# Patient Record
Sex: FEMALE | Race: WHITE | NOT HISPANIC OR LATINO | Employment: PART TIME | URBAN - METROPOLITAN AREA
[De-identification: names, ages, dates, MRNs, and addresses within clinical notes are randomized per-mention and may not be internally consistent; named-entity substitution may affect disease eponyms.]

---

## 2019-01-08 ENCOUNTER — TRANSCRIBE ORDERS (OUTPATIENT)
Dept: ADMINISTRATIVE | Facility: HOSPITAL | Age: 19
End: 2019-01-08

## 2019-01-08 DIAGNOSIS — G89.29 CHRONIC RIGHT SHOULDER PAIN: Primary | ICD-10-CM

## 2019-01-08 DIAGNOSIS — M25.511 CHRONIC RIGHT SHOULDER PAIN: Primary | ICD-10-CM

## 2019-01-16 ENCOUNTER — HOSPITAL ENCOUNTER (OUTPATIENT)
Dept: MRI IMAGING | Facility: HOSPITAL | Age: 19
Discharge: HOME | End: 2019-01-16
Attending: FAMILY MEDICINE
Payer: COMMERCIAL

## 2019-01-16 ENCOUNTER — HOSPITAL ENCOUNTER (OUTPATIENT)
Dept: RADIOLOGY | Facility: HOSPITAL | Age: 19
Discharge: HOME | End: 2019-01-16
Attending: FAMILY MEDICINE | Admitting: RADIOLOGY
Payer: COMMERCIAL

## 2019-01-16 DIAGNOSIS — M25.511 CHRONIC RIGHT SHOULDER PAIN: ICD-10-CM

## 2019-01-16 DIAGNOSIS — G89.29 CHRONIC RIGHT SHOULDER PAIN: ICD-10-CM

## 2019-01-16 PROCEDURE — 77002 NEEDLE LOCALIZATION BY XRAY: CPT

## 2019-01-16 PROCEDURE — 73222 MRI JOINT UPR EXTREM W/DYE: CPT

## 2019-01-16 PROCEDURE — A9585 GADOBUTROL INJECTION: HCPCS | Performed by: FAMILY MEDICINE

## 2019-01-16 PROCEDURE — 23350 INJECTION FOR SHOULDER X-RAY: CPT

## 2019-01-16 RX ORDER — LIDOCAINE HYDROCHLORIDE 10 MG/ML
7 INJECTION, SOLUTION INFILTRATION; PERINEURAL
Status: DISCONTINUED | OUTPATIENT
Start: 2019-01-16 | End: 2019-01-17 | Stop reason: HOSPADM

## 2019-01-16 RX ADMIN — GADOBUTROL 0.2 ML: 604.72 INJECTION INTRAVENOUS at 11:12

## 2019-01-16 RX ADMIN — IOHEXOL 50 ML: 300 INJECTION, SOLUTION INTRAVENOUS at 11:12

## 2019-01-25 ENCOUNTER — EVALUATION (OUTPATIENT)
Dept: PHYSICAL THERAPY | Facility: MEDICAL CENTER | Age: 19
End: 2019-01-25
Payer: COMMERCIAL

## 2019-01-25 ENCOUNTER — TRANSCRIBE ORDERS (OUTPATIENT)
Dept: PHYSICAL THERAPY | Facility: MEDICAL CENTER | Age: 19
End: 2019-01-25

## 2019-01-25 DIAGNOSIS — S43.51XD SPRAIN OF RIGHT ACROMIOCLAVICULAR JOINT, SUBSEQUENT ENCOUNTER: Primary | ICD-10-CM

## 2019-01-25 DIAGNOSIS — M25.511 RIGHT SHOULDER PAIN, UNSPECIFIED CHRONICITY: ICD-10-CM

## 2019-01-25 PROCEDURE — 97161 PT EVAL LOW COMPLEX 20 MIN: CPT | Performed by: PHYSICAL THERAPIST

## 2019-01-25 NOTE — LETTER
2019    Latonya Parker MD  [facility]    Patient: Hua Singletary   YOB: 2000   Date of Visit: 2019     Encounter Diagnosis     ICD-10-CM    1  Sprain of right acromioclavicular joint, subsequent encounter S43  51XD    2  Right shoulder pain, unspecified chronicity M25 511        Dear Dr Darcie Magallanes:    Please review the attached Plan of Care from [facility] recent visit  Please verify that you agree therapy should continue by signing the attached document and sending it back to our office  If you have any questions or concerns, please don't hesitate to call  Sincerely,    Adrian Nash PT      Referring Provider:      I certify that I have read the below Plan of Care and certify the need for these services furnished under this plan of treatment while under my care  Latonya Parker MD  [address] [place] [address]: [phone]          PT Evaluation     Today's date: 2019  Patient name: Hua Singletary  : 2000  MRN: [id number]  Referring provider: Power Burks MD  Dx:   Encounter Diagnosis     ICD-10-CM    1  Sprain of right acromioclavicular joint, subsequent encounter S43  51XD    2  Right shoulder pain, unspecified chronicity M25 511          Assessment  Assessment details: Pt is a pleasant 18 yo female presenting to physical therapy with chronic R shoulder pain s/p AC joint injury from a fall from a horse  Pt would benefit from skilled PT to address current impairments and return pt to pre-morbid function      Symptom irritability: highUnderstanding of Dx/Px/POC: good   Prognosis: good    Goals  Impairment Goals  - Pt I with initial HEP in 1-2 visits  - Improve ROM equal to contralateral side in 4-6 weeks  - Increase strength to 5/5 in all affected areas in 4-6 weeks    Functional Goals  - Increase FOTO to at least 73 in 6-8 weeks  - Patient will be independent with comprehensive HEP in 6-8 weeks  - Patient will be able to reach for object from shelf at shoulder height with min reports of difficulty in 2-4 weeks  - Patient will be able to reach for object overhead with min to difficulty in 6-8 weeks  - Patient will be able to lift/carry objects without provocation of symptoms in 6-8 weeks          Plan  Patient would benefit from: skilled physical therapy  Other planned modality interventions: Modalities prn  Planned therapy interventions: manual therapy, neuromuscular re-education, patient education, strengthening, stretching, therapeutic exercise and home exercise program  Frequency: 2x week  Duration in weeks: 8  Treatment plan discussed with: patient        Subjective Evaluation    History of Present Illness  Mechanism of injury: Pt reports that she injured her R shoulder 2 5 years ago when she fell off a horse  She landed her R side injuring her R shoulder and fracturing her pelvis  Pt has been to PT in the past and has had 8 months of PT on her shoulder and pelvis  Pt has continued problems with her R shoulder  Pt has pain all around her shoulder, but the majority of her pain is on the top of her shoulder  Pain shoots down her arm and her 2-5 digits get numb  Pt notes that when she tries to move her shoulder she has tightness in the front and she can't move past a certain point because her shoulder blade locks up  She has pain with any movement of her R shoulder  Pt had a cortisone injection last week, following an injection for her MRI and it has felt worse since  With increased activity she has increased pain  Pts previous physical therapy did not help  Pain  Current pain ratin  At best pain ratin  At worst pain ratin  Quality: sharp, knife-like and dull ache    Social Support    Working: college student    Exercise history: wt lifting      Diagnostic Tests  MRI studies: abnormal  Treatments  Previous treatment: physical therapy  Patient Goals  Patient goals for therapy: decreased pain, increased strength, increased motion and return to sport/leisure activities          Objective     Postural Observations    Additional Postural Observation Details  + scapular protraction, anterior tilting and winging B    Palpation     Additional Palpation Details  Pt is exquisitely tender throughout her R shoulder, especially over her AC joint and LTBT proximally  Cervical/Thoracic Screen   Cervical range of motion within normal limits  Thoracic range of motion within normal limits    Neurological Testing     Sensation     Shoulder   Left Shoulder   Intact: light touch    Right Shoulder   Intact: light touch    Active Range of Motion   Left Shoulder   Flexion: 160 degrees   Abduction: 170 degrees     Right Shoulder   Flexion: 70 degrees   Abduction: 90 degrees     Passive Range of Motion   Left Shoulder   External rotation 90°:  120 degrees   Internal rotation 90°:  50 degrees     Right Shoulder   Flexion: 100 degrees   Abduction: 100 degrees   External rotation 90°:  90 degrees   Internal rotation 90°:  30 degrees     Additional Passive Range of Motion Details  Pt exhibits extreme scapular winging with AROM R shoulder  Joint Play     Additional Joint Play Details  Unable to assess R shoulder joint mobility due to guarding and c/o extreme TTP throughout her R shoulder  Strength/Myotome Testing     Left Shoulder     Planes of Motion   Flexion: 5   Abduction: 5   External rotation at 0°:  5   Internal rotation at 0°:  5     Right Shoulder     Planes of Motion   Flexion: 2+   Abduction: 2+   External rotation at 0°:  4   Internal rotation at 0°:  4+     Additional Strength Details  Elbow flexion and extension: 5/5 B    Tests     Left Shoulder   Negative scapular relocation  Right Shoulder   Negative scapular relocation             Precautions: None    Daily Treatment Diary     Manual  1/25            PROM HK Exercise Diary  1/25            UBE NV            SL ER IP            Scap sq IP            Petros scap sq IP

## 2019-01-25 NOTE — PROGRESS NOTES
PT Evaluation     Today's date: 2019  Patient name: Sylvia Parsons  : 2000  MRN: [id number]  Referring provider: Nancy Benitez MD  Dx:   Encounter Diagnosis     ICD-10-CM    1  Sprain of right acromioclavicular joint, subsequent encounter S43  51XD    2  Right shoulder pain, unspecified chronicity M25 511          Assessment  Assessment details: Pt is a pleasant 18 yo female presenting to physical therapy with chronic R shoulder pain s/p AC joint injury from a fall from a horse  Pt would benefit from skilled PT to address current impairments and return pt to pre-morbid function  Symptom irritability: highUnderstanding of Dx/Px/POC: good   Prognosis: good    Goals  Impairment Goals  - Pt I with initial HEP in 1-2 visits  - Improve ROM equal to contralateral side in 4-6 weeks  - Increase strength to 5/5 in all affected areas in 4-6 weeks    Functional Goals  - Increase FOTO to at least 73 in 6-8 weeks  - Patient will be independent with comprehensive HEP in 6-8 weeks  - Patient will be able to reach for object from shelf at shoulder height with min reports of difficulty in 2-4 weeks  - Patient will be able to reach for object overhead with min to difficulty in 6-8 weeks  - Patient will be able to lift/carry objects without provocation of symptoms in 6-8 weeks          Plan  Patient would benefit from: skilled physical therapy  Other planned modality interventions: Modalities prn  Planned therapy interventions: manual therapy, neuromuscular re-education, patient education, strengthening, stretching, therapeutic exercise and home exercise program  Frequency: 2x week  Duration in weeks: 8  Treatment plan discussed with: patient        Subjective Evaluation    History of Present Illness  Mechanism of injury: Pt reports that she injured her R shoulder 2 5 years ago when she fell off a horse  She landed her R side injuring her R shoulder and fracturing her pelvis    Pt has been to PT in the past and has had 8 months of PT on her shoulder and pelvis  Pt has continued problems with her R shoulder  Pt has pain all around her shoulder, but the majority of her pain is on the top of her shoulder  Pain shoots down her arm and her 2-5 digits get numb  Pt notes that when she tries to move her shoulder she has tightness in the front and she can't move past a certain point because her shoulder blade locks up  She has pain with any movement of her R shoulder  Pt had a cortisone injection last week, following an injection for her MRI and it has felt worse since  With increased activity she has increased pain  Pts previous physical therapy did not help  Pain  Current pain ratin  At best pain ratin  At worst pain ratin  Quality: sharp, knife-like and dull ache    Social Support    Working: college student  Exercise history: wt lifting      Diagnostic Tests  MRI studies: abnormal  Treatments  Previous treatment: physical therapy  Patient Goals  Patient goals for therapy: decreased pain, increased strength, increased motion and return to sport/leisure activities          Objective     Postural Observations    Additional Postural Observation Details  + scapular protraction, anterior tilting and winging B    Palpation     Additional Palpation Details  Pt is exquisitely tender throughout her R shoulder, especially over her AC joint and LTBT proximally      Cervical/Thoracic Screen   Cervical range of motion within normal limits  Thoracic range of motion within normal limits    Neurological Testing     Sensation     Shoulder   Left Shoulder   Intact: light touch    Right Shoulder   Intact: light touch    Active Range of Motion   Left Shoulder   Flexion: 160 degrees   Abduction: 170 degrees     Right Shoulder   Flexion: 70 degrees   Abduction: 90 degrees     Passive Range of Motion   Left Shoulder   External rotation 90°: 120 degrees   Internal rotation 90°: 50 degrees     Right Shoulder   Flexion: 100 degrees   Abduction: 100 degrees   External rotation 90°: 90 degrees   Internal rotation 90°: 30 degrees     Additional Passive Range of Motion Details  Pt exhibits extreme scapular winging with AROM R shoulder  Joint Play     Additional Joint Play Details  Unable to assess R shoulder joint mobility due to guarding and c/o extreme TTP throughout her R shoulder  Strength/Myotome Testing     Left Shoulder     Planes of Motion   Flexion: 5   Abduction: 5   External rotation at 0°: 5   Internal rotation at 0°: 5     Right Shoulder     Planes of Motion   Flexion: 2+   Abduction: 2+   External rotation at 0°: 4   Internal rotation at 0°: 4+     Additional Strength Details  Elbow flexion and extension: 5/5 B    Tests     Left Shoulder   Negative scapular relocation  Right Shoulder   Negative scapular relocation             Precautions: None    Daily Treatment Diary     Manual  1/25            PROM HK                                                                    Exercise Diary  1/25            UBE NV            SL ER IP            Scap sq IP            Petros scap sq IP

## 2019-01-28 ENCOUNTER — OFFICE VISIT (OUTPATIENT)
Dept: PHYSICAL THERAPY | Facility: MEDICAL CENTER | Age: 19
End: 2019-01-28
Payer: COMMERCIAL

## 2019-01-28 DIAGNOSIS — S43.51XD SPRAIN OF RIGHT ACROMIOCLAVICULAR JOINT, SUBSEQUENT ENCOUNTER: Primary | ICD-10-CM

## 2019-01-28 DIAGNOSIS — M25.511 RIGHT SHOULDER PAIN, UNSPECIFIED CHRONICITY: ICD-10-CM

## 2019-01-28 PROCEDURE — 97140 MANUAL THERAPY 1/> REGIONS: CPT | Performed by: PHYSICAL THERAPIST

## 2019-01-28 NOTE — PROGRESS NOTES
Daily Note     Today's date: 2019  Patient name: Veronica Mccollum  : 2000  MRN: [id number]  Referring provider: Agnes Trejo MD  Dx:   Encounter Diagnosis     ICD-10-CM    1  Sprain of right acromioclavicular joint, subsequent encounter S43  51XD    2  Right shoulder pain, unspecified chronicity M25 511        Subjective: Pt reports that her R shoulder blade has been bothering her for the past two days  Two days ago she put weight on her R UE and felt a strange pop in her R shoulder blade  Objective: See treatment diary below      Assessment: Tolerated treatment well  Patient demonstrated fatigue post treatment, exhibited good technique with therapeutic exercises and would benefit from continued PT  Pt responded well to tx and had decreased sxs post tx  Plan: Continue per plan of care       Precautions: None    Daily Treatment Diary     Manual             PROM HK HK                                                                   Exercise Diary             UBE NV 3F/3B           SL ER IP 3x10           Scap sq IP 5"  2x10           Robbery scap sq IP 5"  2x10           UE alphabet  NV           Prone pro/ret  2x10                                                                                                                                                                                    MH  15'

## 2019-02-01 ENCOUNTER — OFFICE VISIT (OUTPATIENT)
Dept: PHYSICAL THERAPY | Facility: MEDICAL CENTER | Age: 19
End: 2019-02-01
Payer: COMMERCIAL

## 2019-02-01 DIAGNOSIS — S43.51XD SPRAIN OF RIGHT ACROMIOCLAVICULAR JOINT, SUBSEQUENT ENCOUNTER: ICD-10-CM

## 2019-02-01 DIAGNOSIS — M25.511 RIGHT SHOULDER PAIN, UNSPECIFIED CHRONICITY: Primary | ICD-10-CM

## 2019-02-01 PROCEDURE — 97110 THERAPEUTIC EXERCISES: CPT | Performed by: PHYSICAL THERAPIST

## 2019-02-01 NOTE — PROGRESS NOTES
Daily Note     Today's date: 2019  Patient name: Veronica Mccollum  : 2000  MRN: [id number]  Referring provider: Agnes Trejo MD  Dx:   Encounter Diagnosis     ICD-10-CM    1  Right shoulder pain, unspecified chronicity M25 511    2  Sprain of right acromioclavicular joint, subsequent encounter S43  51XD             Subjective: Pt reports that her shoulder feels tight from increased gym activity this week  She states that the tightness is in her arm pit  Objective: See treatment diary below      Assessment: Tolerated treatment fair  Patient demonstrated fatigue post treatment, exhibited good technique with therapeutic exercises and would benefit from continued PT  Plan: Continue per plan of care       Precautions: None    Daily Treatment Diary     Manual            PROM HK HK HK  Side  lying                                                                  Exercise Diary            UBE NV 3F/3B 3F/3B          SL ER IP 3x10 3x15          Scap sq IP 5"  2x10 5"  3x10          Robbery scap sq IP 5"  2x10 5"  3x10          UE alphabet  NV NP          Prone pro/ret  2x10 3x10          Table flexion and ER   x30          Pulleys    6'                                                                                                                                                         [facility]  15' 15'

## 2019-02-04 ENCOUNTER — OFFICE VISIT (OUTPATIENT)
Dept: PHYSICAL THERAPY | Facility: MEDICAL CENTER | Age: 19
End: 2019-02-04
Payer: COMMERCIAL

## 2019-02-04 DIAGNOSIS — M25.511 RIGHT SHOULDER PAIN, UNSPECIFIED CHRONICITY: Primary | ICD-10-CM

## 2019-02-04 DIAGNOSIS — S43.51XD SPRAIN OF RIGHT ACROMIOCLAVICULAR JOINT, SUBSEQUENT ENCOUNTER: ICD-10-CM

## 2019-02-04 PROCEDURE — 97110 THERAPEUTIC EXERCISES: CPT | Performed by: PHYSICAL THERAPIST

## 2019-02-04 NOTE — PROGRESS NOTES
Daily Note     Today's date: 2019  Patient name: Susan Watson  : 2000  MRN: [id number]  Referring provider: Uriah Fernandez MD  Dx:   Encounter Diagnosis     ICD-10-CM    1  Right shoulder pain, unspecified chronicity M25 511    2  Sprain of right acromioclavicular joint, subsequent encounter S43  51XD          Subjective: Pt reports that yesterday was the best she has felt in a long time  Objective: See treatment diary below      Assessment: Tolerated treatment well  Patient demonstrated fatigue post treatment, exhibited good technique with therapeutic exercises and would benefit from continued PT  Pt with improved PROM today  Pt appears to be responding well to the current tx plan  Plan: Continue per plan of care       Precautions: None    Daily Treatment Diary     Manual           PROM HK HK HK  Side  lying HK  SL                                                                 Exercise Diary   2/4         UBE NV 3F/3B 3F/3B 3F/3B         SL ER IP 3x10 3x15 3x15         Scap sq IP 5"  2x10 5"  3x10 5"  3x12         Robbery scap sq IP 5"  2x10 5"  3x10 5"  3x12         UE alphabet  NV NP NP         Prone pro/ret  2x10 3x10 3x10         Table flexion and ER   x30 x30         Pulleys    6' 6'                                                                                                                                                        [facility]  15' 15' 15'

## 2019-02-08 ENCOUNTER — OFFICE VISIT (OUTPATIENT)
Dept: PHYSICAL THERAPY | Facility: MEDICAL CENTER | Age: 19
End: 2019-02-08
Payer: COMMERCIAL

## 2019-02-08 DIAGNOSIS — M25.511 RIGHT SHOULDER PAIN, UNSPECIFIED CHRONICITY: Primary | ICD-10-CM

## 2019-02-08 DIAGNOSIS — S43.51XD SPRAIN OF RIGHT ACROMIOCLAVICULAR JOINT, SUBSEQUENT ENCOUNTER: ICD-10-CM

## 2019-02-08 PROCEDURE — 97110 THERAPEUTIC EXERCISES: CPT | Performed by: PHYSICAL THERAPIST

## 2019-02-08 NOTE — PROGRESS NOTES
Daily Note     Today's date: 2019  Patient name: Vipul Valentine  : 2000  MRN: [id number]  Referring provider: Lisa Moses MD  Dx:   Encounter Diagnosis     ICD-10-CM    1  Right shoulder pain, unspecified chronicity M25 511    2  Sprain of right acromioclavicular joint, subsequent encounter S43  51XD          Subjective: Pt reports that her R shoulder is not doing so well today  She states that on Tuesday her collarbone started to hurt her and then on Wednesday her shoulder blade became uncomfortable  Pt is more painful and tight since then and has limited her activity level  Objective: See treatment diary below      Assessment: Tolerated treatment well  Patient demonstrated fatigue post treatment, exhibited good technique with therapeutic exercises and would benefit from continued PT  Pt would benefit from increased activity with her R UE  It appears as if her activity level decreases due to pain, the pain worsens and her tightness increases  Plan: Continue per plan of care       Precautions: None    Daily Treatment Diary     Manual          PROM HK HK HK  Side  lying HK  SL HK  Side  lying                                                                Exercise Diary          UBE NV 3F/3B 3F/3B 3F/3B 3F/3B        SL ER IP 3x10 3x15 3x15 3X15        Scap sq IP 5"  2x10 5"  3x10 5"  3x12 5"  3x15        Robbery scap sq IP 5"  2x10 5"  3x10 5"  3x12 5'  3x15        UE alphabet  NV NP NP NP        Prone pro/ret  2x10 3x10 3x10 3x10        Table flexion and ER   x30 x30 x30        Pulleys    6' 6' 6'                                                                                                                                                       [address]  15' 15' 15' 15'

## 2019-02-11 ENCOUNTER — OFFICE VISIT (OUTPATIENT)
Dept: PHYSICAL THERAPY | Facility: MEDICAL CENTER | Age: 19
End: 2019-02-11
Payer: COMMERCIAL

## 2019-02-11 DIAGNOSIS — S43.51XD SPRAIN OF RIGHT ACROMIOCLAVICULAR JOINT, SUBSEQUENT ENCOUNTER: ICD-10-CM

## 2019-02-11 DIAGNOSIS — M25.511 RIGHT SHOULDER PAIN, UNSPECIFIED CHRONICITY: Primary | ICD-10-CM

## 2019-02-11 PROCEDURE — 97140 MANUAL THERAPY 1/> REGIONS: CPT | Performed by: PHYSICAL THERAPIST

## 2019-02-11 NOTE — PROGRESS NOTES
Daily Note     Today's date: 2019  Patient name: Roseline Gee  : 2000  MRN: [id number]  Referring provider: Saud Boyce MD  Dx:   Encounter Diagnosis     ICD-10-CM    1  Right shoulder pain, unspecified chronicity M25 511    2  Sprain of right acromioclavicular joint, subsequent encounter S43  51XD        Subjective: Pt reports that her shoulder bothered her over the weekend because of the cold weather  Objective: See treatment diary below      Assessment: Tolerated treatment fair  Patient demonstrated fatigue post treatment, exhibited good technique with therapeutic exercises and would benefit from continued PT  PROM and activity tolerance in physical therapy are slowly improving  Plan: Continue per plan of care       Precautions: None    Daily Treatment Diary     Manual         PROM HK HK HK  Side  lying HK  SL HK  Side  lying HK  Side  lying                                                               Exercise Diary   2       UBE NV 3F/3B 3F/3B 3F/3B 3F/3B 3F/3B       SL ER IP 3x10 3x15 3x15 3X15 3x15       Scap sq IP 5"  2x10 5"  3x10 5"  3x12 5"  3x15 Y  3x10       Robbery scap sq IP 5"  2x10 5"  3x10 5"  3x12 5'  3x15 Y   3x10       UE alphabet  NV NP NP NP 1X  Side lying       Prone pro/ret  2x10 3x10 3x10 3x10 3x15       Table flexion and ER   x30 x30 x30 x30       Pulleys    6' 6' 6' 6'                                                                                                                                                      [address]  15' 15' 15' 15' 15'

## 2019-02-15 ENCOUNTER — OFFICE VISIT (OUTPATIENT)
Dept: PHYSICAL THERAPY | Facility: MEDICAL CENTER | Age: 19
End: 2019-02-15
Payer: COMMERCIAL

## 2019-02-15 DIAGNOSIS — S43.51XD SPRAIN OF RIGHT ACROMIOCLAVICULAR JOINT, SUBSEQUENT ENCOUNTER: ICD-10-CM

## 2019-02-15 DIAGNOSIS — M25.511 RIGHT SHOULDER PAIN, UNSPECIFIED CHRONICITY: Primary | ICD-10-CM

## 2019-02-15 PROCEDURE — 97112 NEUROMUSCULAR REEDUCATION: CPT

## 2019-02-15 PROCEDURE — 97140 MANUAL THERAPY 1/> REGIONS: CPT

## 2019-02-15 PROCEDURE — 97110 THERAPEUTIC EXERCISES: CPT

## 2019-02-15 NOTE — PROGRESS NOTES
Daily Note     Today's date: 2/15/2019  Patient name: Maira Gupta  : 2000  MRN: [id number]  Referring provider: Angela Garg MD  Dx:   Encounter Diagnosis     ICD-10-CM    1  Right shoulder pain, unspecified chronicity M25 511    2  Sprain of right acromioclavicular joint, subsequent encounter S43  51XD                   Subjective: Pt reports that she has more soreness in her anterior shoulder/distal clavicle today  Objective: See treatment diary below  Precautions: None    Daily Treatment Diary     Manual  1/25 1/28 2/1 2/4 2/8 2/11 2/15      PROM HK HK HK  Side  lying HK  SL HK  Side  lying HK  Side  lying KO  Side  lying                   HPL to R AC joint - sprain/  Strain protocol       KO                                    Exercise Diary  1/25 1/28 2/1 2/4 2/8 2/11 2/15      UBE NV 3F/3B 3F/3B 3F/3B 3F/3B 3F/3B 3F/3B      SL ER IP 3x10 3x15 3x15 3X15 3x15 3x15      Scap sq IP 5"  2x10 5"  3x10 5"  3x12 5"  3x15 Y  3x10 Y  3x10      Robbery scap sq IP 5"  2x10 5"  3x10 5"  3x12 5'  3x15 Y   3x10 Y  3x10      UE alphabet  NV NP NP NP 1X  Side lying 1x  Side  lying      Prone pro/ret  2x10 3x10 3x10 3x10 3x15 3x15      Table flexion and ER   x30 x30 x30 x30 x30      Pulleys    6' 6' 6' 6' 6'                                                                                                                                                     MH  15' 15' 15' 15' 15' 15'            Assessment: Tolerated treat ment fair  Patient demonstrated fatigue post treatment and would benefit from continued PT  Pt guarded and with discomfort throughout manuals, as well as her ex's  Plan: Continue per plan of care

## 2019-02-18 ENCOUNTER — OFFICE VISIT (OUTPATIENT)
Dept: PHYSICAL THERAPY | Facility: MEDICAL CENTER | Age: 19
End: 2019-02-18
Payer: COMMERCIAL

## 2019-02-18 DIAGNOSIS — S43.51XD SPRAIN OF RIGHT ACROMIOCLAVICULAR JOINT, SUBSEQUENT ENCOUNTER: ICD-10-CM

## 2019-02-18 DIAGNOSIS — M25.511 RIGHT SHOULDER PAIN, UNSPECIFIED CHRONICITY: Primary | ICD-10-CM

## 2019-02-18 PROCEDURE — 97110 THERAPEUTIC EXERCISES: CPT

## 2019-02-18 PROCEDURE — 97140 MANUAL THERAPY 1/> REGIONS: CPT

## 2019-02-18 PROCEDURE — 97112 NEUROMUSCULAR REEDUCATION: CPT

## 2019-02-18 NOTE — PROGRESS NOTES
Daily Note     Today's date: 2019  Patient name: Veronica Mccollum  : 2000  MRN: [id number]  Referring provider: Agnes Trejo MD  Dx:   Encounter Diagnosis     ICD-10-CM    1  Right shoulder pain, unspecified chronicity M25 511    2  Sprain of right acromioclavicular joint, subsequent encounter S43  51XD                   Subjective: Pt reports that she experienced some relief which lasted a few hours after HPL tx at   Objective: See treatment diary below  Daily Treatment Diary     Manual  1/25 1/28 2/1 2/4 2/8 2/11 2/15 2/18     PROM HK HK HK  Side  lying HK  SL HK  Side  lying HK  Side  lying KO  Side  lying KO   Side  lying                  HPL to R AC joint - sprain/  Strain protocol       KO KO                                   Exercise Diary  1/25 1/28 2/1 2/4 2/8 2/11 2/15 2/18     UBE NV 3F/3B 3F/3B 3F/3B 3F/3B 3F/3B 3F/3B 3F/3B     SL ER IP 3x10 3x15 3x15 3X15 3x15 3x15 3x15     Scap sq IP 5"  2x10 5"  3x10 5"  3x12 5"  3x15 Y  3x10 Y  3x10 Y  3x12     Robbery scap sq IP 5"  2x10 5"  3x10 5"  3x12 5'  3x15 Y   3x10 Y  3x10 Y  3x12     UE alphabet  NV NP NP NP 1X  Side lying 1x  Side  lying 1x  Side  lying     Prone pro/ret  2x10 3x10 3x10 3x10 3x15 3x15 3x15     Table flexion and ER   x30 x30 x30 x30 x30 x30     Pulleys    6' 6' 6' 6' 6' 6'                                                                                                                                                      15' 15' 15' 15' 15' 15' 15'             Assessment: Tolerated treatment well  Patient demonstrated fatigue post treatment, exhibited good technique with therapeutic exercises and would benefit from continued PT  Pt c/o neural discomfort and some shoulder discomfort w/exs  Pt also feels as though her unlar nerve moves over her elbow with certain movements  Plan: Continue per plan of care

## 2019-02-22 ENCOUNTER — OFFICE VISIT (OUTPATIENT)
Dept: PHYSICAL THERAPY | Facility: MEDICAL CENTER | Age: 19
End: 2019-02-22
Payer: COMMERCIAL

## 2019-02-22 DIAGNOSIS — M25.511 RIGHT SHOULDER PAIN, UNSPECIFIED CHRONICITY: Primary | ICD-10-CM

## 2019-02-22 DIAGNOSIS — S43.51XD SPRAIN OF RIGHT ACROMIOCLAVICULAR JOINT, SUBSEQUENT ENCOUNTER: ICD-10-CM

## 2019-02-22 PROCEDURE — 97140 MANUAL THERAPY 1/> REGIONS: CPT

## 2019-02-22 NOTE — PROGRESS NOTES
Daily Note     Today's date: 2019  Patient name: Juana Bañuelos  : 2000  MRN: [id number]  Referring provider: Corey Avila MD  Dx:   Encounter Diagnosis     ICD-10-CM    1  Right shoulder pain, unspecified chronicity M25 511    2  Sprain of right acromioclavicular joint, subsequent encounter S43  51XD                   Subjective: Pt reports that the pain in her posterior shoulder has dissipated, but now has more anterior shoulder pain and is moving to her proximal clavicle bone  Objective: See treatment diary below  Daily Treatment Diary     Manual  1/25 1/28 2/1 2/4 2/8 2/11 2/15 2/18 2/22    PROM HK HK HK  Side  lying HK  SL HK  Side  lying HK  Side  lying KO  Side  lying KO   Side  lying KO  Side  lying                 HPL to R AC joint - sprain/  Strain protocol       KO KO KO    K-tape to [facility] joint for support         KO                     Exercise Diary  1/25 1/28 2/1 2/4 2/8 2/11 2/15 2/18 2/22    UBE NV 3F/3B 3F/3B 3F/3B 3F/3B 3F/3B 3F/3B 3F/3B 3F/3B    SL ER IP 3x10 3x15 3x15 3X15 3x15 3x15 3x15 np    Scap sq IP 5"  2x10 5"  3x10 5"  3x12 5"  3x15 Y  3x10 Y  3x10 Y  3x12 5"  3x10    Robbery scap sq IP 5"  2x10 5"  3x10 5"  3x12 5'  3x15 Y   3x10 Y  3x10 Y  3x12 5"  3x10    UE alphabet  NV NP NP NP 1X  Side lying 1x  Side  lying 1x  Side  lying 1x  Side lying    Prone pro/ret  2x10 3x10 3x10 3x10 3x15 3x15 3x15 3x15    Table flexion and ER   x30 x30 x30 x30 x30 x30 x30    Pulleys    6' 6' 6' 6' 6' 6' 6'                                                                                                                                                     15' 15' 15' 15' 15' 15' 15' 15'              Assessment: Tolerated treatment well  Patient demonstrated fatigue post treatment, exhibited good technique with therapeutic exercises and would benefit from continued PT  Pt guarded and with discomfort throughout PROM  Pt continued to have discomfort throughout TE    Pt felt good relief w/HPL tx, as well as relief and support w/ k-tape application  Plan: Continue per plan of care

## 2019-02-25 ENCOUNTER — OFFICE VISIT (OUTPATIENT)
Dept: PHYSICAL THERAPY | Facility: MEDICAL CENTER | Age: 19
End: 2019-02-25
Payer: COMMERCIAL

## 2019-02-25 DIAGNOSIS — M25.511 RIGHT SHOULDER PAIN, UNSPECIFIED CHRONICITY: Primary | ICD-10-CM

## 2019-02-25 DIAGNOSIS — S43.51XD SPRAIN OF RIGHT ACROMIOCLAVICULAR JOINT, SUBSEQUENT ENCOUNTER: ICD-10-CM

## 2019-02-25 PROCEDURE — 97140 MANUAL THERAPY 1/> REGIONS: CPT

## 2019-02-25 NOTE — PROGRESS NOTES
Daily Note     Today's date: 2019  Patient name: Andrea Adrian  : 2000  MRN: [id number]  Referring provider: Bridgette Falcon MD  Dx:   Encounter Diagnosis     ICD-10-CM    1  Right shoulder pain, unspecified chronicity M25 511    2  Sprain of right acromioclavicular joint, subsequent encounter S43  51XD                   Subjective: Pt reports that she felt good support with the k-tape application at   Pt now has some c/o posterior scapula discomfort  Pt feels as though the "bump" at her [facility] joint has gotten bigger as well  Objective: See treatment diary below    Daily Treatment Diary     Manual  1/25 1/28 2/1 2/4 2/8 2/11 2/15 2/18 2/22 2/25   PROM HK HK HK  Side  lying HK  SL HK  Side  lying HK  Side  lying KO  Side  lying KO   Side  lying KO  Side  lying KO  Side  lying                HPL to R AC joint - sprain/  Strain protocol       KO KO KO KO   K-tape to [facility] joint for support         KO KO                    Exercise Diary  1/25 1/28 2/1 2/4 2/8 2/11 2/15 2/18 2/22 2/25   UBE NV 3F/3B 3F/3B 3F/3B 3F/3B 3F/3B 3F/3B 3F/3B 3F/3B 3F/3B   SL ER IP 3x10 3x15 3x15 3X15 3x15 3x15 3x15 np 3x12   Scap sq IP 5"  2x10 5"  3x10 5"  3x12 5"  3x15 Y  3x10 Y  3x10 Y  3x12 5"  3x10 Red  3x10   Robbery scap sq IP 5"  2x10 5"  3x10 5"  3x12 5'  3x15 Y   3x10 Y  3x10 Y  3x12 5"  3x10 Red  3x10   UE alphabet  NV NP NP NP 1X  Side lying 1x  Side  lying 1x  Side  lying 1x  Side lying 2x   Prone pro/ret  2x10 3x10 3x10 3x10 3x15 3x15 3x15 3x15 3x15   Table flexion and ER   x30 x30 x30 x30 x30 x30 x30 x30   Pulleys    6' 6' 6' 6' 6' 6' 6' 6'                                                                                                                                                  MH  15' 15' 15' 15' 15' 15' 15' 15' 15'           Assessment: Tolerated treatment well   Patient demonstrated fatigue post treatment, exhibited good technique with therapeutic exercises and would benefit from continued PT  Pt able to progress with strengthening ex's today  Plan: Continue per plan of care

## 2019-03-01 ENCOUNTER — APPOINTMENT (OUTPATIENT)
Dept: PHYSICAL THERAPY | Facility: MEDICAL CENTER | Age: 19
End: 2019-03-01
Payer: COMMERCIAL

## 2019-03-04 ENCOUNTER — OFFICE VISIT (OUTPATIENT)
Dept: PHYSICAL THERAPY | Facility: MEDICAL CENTER | Age: 19
End: 2019-03-04
Payer: COMMERCIAL

## 2019-03-04 DIAGNOSIS — S43.51XD SPRAIN OF RIGHT ACROMIOCLAVICULAR JOINT, SUBSEQUENT ENCOUNTER: ICD-10-CM

## 2019-03-04 DIAGNOSIS — M25.511 RIGHT SHOULDER PAIN, UNSPECIFIED CHRONICITY: Primary | ICD-10-CM

## 2019-03-04 PROCEDURE — 97110 THERAPEUTIC EXERCISES: CPT

## 2019-03-04 PROCEDURE — 97140 MANUAL THERAPY 1/> REGIONS: CPT

## 2019-03-04 PROCEDURE — 97112 NEUROMUSCULAR REEDUCATION: CPT

## 2019-03-04 NOTE — PROGRESS NOTES
Daily Note     Today's date: 3/4/2019  Patient name: Pradip Basurto  : 2000  MRN: [id number]  Referring provider: Shawn Davidson MD  Dx:   Encounter Diagnosis     ICD-10-CM    1  Right shoulder pain, unspecified chronicity M25 511    2  Sprain of right acromioclavicular joint, subsequent encounter S43  51XD                   Subjective: Pt states that she is going to have ulnar nerve transposition surgery next Tuesday, 3/12  Pt  Was referred to another surgeon in [place] regarding her shoulder and was advised by Dr Daylin Villalpando to continue PT  Objective: See treatment diary below    Daily Treatment Diary     Manual  3/4            PROM KO  Side  lying                         HPL to R AC joint - sprain/  Strain protocol KO            K-tape to [facility] joint for support KO                             Exercise Diary  3/4            UBE 3F/3B            SL ER 3x12            Scap sq Red  3x10            Robbery scap sq Red  3x10            UE alphabet 2x            Prone pro/ret 30x            Table flexion and ER 30x            Pulleys  6'                                                                                                                                                           MH np                Assessment: Tolerated treatment well  Patient demonstrated fatigue post treatment, exhibited good technique with therapeutic exercises and would benefit from continued PT   Pt displayed improved tolerance to manuals and TE today  Plan: Continue per plan of care

## 2019-03-08 ENCOUNTER — OFFICE VISIT (OUTPATIENT)
Dept: PHYSICAL THERAPY | Facility: MEDICAL CENTER | Age: 19
End: 2019-03-08
Payer: COMMERCIAL

## 2019-03-08 DIAGNOSIS — M25.511 RIGHT SHOULDER PAIN, UNSPECIFIED CHRONICITY: Primary | ICD-10-CM

## 2019-03-08 DIAGNOSIS — S43.51XD SPRAIN OF RIGHT ACROMIOCLAVICULAR JOINT, SUBSEQUENT ENCOUNTER: ICD-10-CM

## 2019-03-08 PROCEDURE — 97140 MANUAL THERAPY 1/> REGIONS: CPT

## 2019-03-08 PROCEDURE — 97110 THERAPEUTIC EXERCISES: CPT

## 2019-03-08 PROCEDURE — 97112 NEUROMUSCULAR REEDUCATION: CPT

## 2019-03-08 NOTE — PROGRESS NOTES
Daily Note     Today's date: 3/8/2019  Patient name: Maira Gupta  : 2000  MRN: [id number]  Referring provider: Angela Garg MD  Dx:   Encounter Diagnosis     ICD-10-CM    1  Right shoulder pain, unspecified chronicity M25 511    2  Sprain of right acromioclavicular joint, subsequent encounter S43  51XD                   Subjective: Pt reports that her collar bone felt better after LV, but now has more discomfort in her posterior shoulder  Objective: See treatment diary below  Daily Treatment Diary     Manual  3/4 3/8           PROM KO  Side  lying KO  sidelying                        HPL to R AC joint - sprain/  Strain protocol KO KO           K-tape to [facility] joint for support KO STEPHANIE                            Exercise Diary  3/4 3/8           UBE 3F/3B 3F/3B           SL ER 3x12 1#  3x10           Scap sq Red  3x10 Red  3x10           Robbery scap sq Red  3x10 Red  3x10           UE alphabet 2x 2x           Prone pro/ret 30x 30x           Table flexion and ER 30x 30x           Pulleys  6' 6'                                                                                                                                                          [facility]               Assessment: Tolerated treatment well  Patient demonstrated fatigue post treatment, exhibited good technique with therapeutic exercises and would benefit from continued PT  Pt will return to PT after her ulnar nerve surgery on Tuesday 3/12  Plan: Continue per plan of care

## 2019-03-15 ENCOUNTER — APPOINTMENT (OUTPATIENT)
Dept: PHYSICAL THERAPY | Facility: MEDICAL CENTER | Age: 19
End: 2019-03-15
Payer: COMMERCIAL

## 2019-04-01 ENCOUNTER — EVALUATION (OUTPATIENT)
Dept: PHYSICAL THERAPY | Facility: MEDICAL CENTER | Age: 19
End: 2019-04-01
Payer: COMMERCIAL

## 2019-04-01 DIAGNOSIS — G56.21 ULNAR NERVE ENTRAPMENT AT ELBOW, RIGHT: ICD-10-CM

## 2019-04-01 DIAGNOSIS — M25.521 RIGHT ELBOW PAIN: Primary | ICD-10-CM

## 2019-04-01 PROCEDURE — 97140 MANUAL THERAPY 1/> REGIONS: CPT | Performed by: PHYSICAL THERAPIST

## 2019-04-01 PROCEDURE — 97161 PT EVAL LOW COMPLEX 20 MIN: CPT | Performed by: PHYSICAL THERAPIST

## 2019-04-02 ENCOUNTER — TRANSCRIBE ORDERS (OUTPATIENT)
Dept: PHYSICAL THERAPY | Facility: MEDICAL CENTER | Age: 19
End: 2019-04-02

## 2019-04-02 DIAGNOSIS — M25.521 RIGHT ELBOW PAIN: Primary | ICD-10-CM

## 2019-04-05 ENCOUNTER — OFFICE VISIT (OUTPATIENT)
Dept: PHYSICAL THERAPY | Facility: MEDICAL CENTER | Age: 19
End: 2019-04-05
Payer: COMMERCIAL

## 2019-04-05 DIAGNOSIS — S43.51XD SPRAIN OF RIGHT ACROMIOCLAVICULAR JOINT, SUBSEQUENT ENCOUNTER: ICD-10-CM

## 2019-04-05 DIAGNOSIS — G56.21 ULNAR NERVE ENTRAPMENT AT ELBOW, RIGHT: ICD-10-CM

## 2019-04-05 DIAGNOSIS — M25.511 RIGHT SHOULDER PAIN, UNSPECIFIED CHRONICITY: ICD-10-CM

## 2019-04-05 DIAGNOSIS — M25.521 RIGHT ELBOW PAIN: Primary | ICD-10-CM

## 2019-04-05 PROCEDURE — 97110 THERAPEUTIC EXERCISES: CPT | Performed by: PHYSICAL THERAPIST

## 2019-04-05 PROCEDURE — 97140 MANUAL THERAPY 1/> REGIONS: CPT | Performed by: PHYSICAL THERAPIST

## 2019-04-08 ENCOUNTER — OFFICE VISIT (OUTPATIENT)
Dept: PHYSICAL THERAPY | Facility: MEDICAL CENTER | Age: 19
End: 2019-04-08
Payer: COMMERCIAL

## 2019-04-08 DIAGNOSIS — G56.21 ULNAR NERVE ENTRAPMENT AT ELBOW, RIGHT: ICD-10-CM

## 2019-04-08 DIAGNOSIS — M25.521 RIGHT ELBOW PAIN: Primary | ICD-10-CM

## 2019-04-08 DIAGNOSIS — S43.51XD SPRAIN OF RIGHT ACROMIOCLAVICULAR JOINT, SUBSEQUENT ENCOUNTER: ICD-10-CM

## 2019-04-08 DIAGNOSIS — M25.511 RIGHT SHOULDER PAIN, UNSPECIFIED CHRONICITY: ICD-10-CM

## 2019-04-08 PROCEDURE — 97110 THERAPEUTIC EXERCISES: CPT | Performed by: PHYSICAL THERAPIST

## 2019-04-08 PROCEDURE — 97140 MANUAL THERAPY 1/> REGIONS: CPT | Performed by: PHYSICAL THERAPIST

## 2019-04-12 ENCOUNTER — OFFICE VISIT (OUTPATIENT)
Dept: PHYSICAL THERAPY | Facility: MEDICAL CENTER | Age: 19
End: 2019-04-12
Payer: COMMERCIAL

## 2019-04-12 DIAGNOSIS — M25.521 RIGHT ELBOW PAIN: Primary | ICD-10-CM

## 2019-04-12 DIAGNOSIS — S43.51XD SPRAIN OF RIGHT ACROMIOCLAVICULAR JOINT, SUBSEQUENT ENCOUNTER: ICD-10-CM

## 2019-04-12 DIAGNOSIS — M25.511 RIGHT SHOULDER PAIN, UNSPECIFIED CHRONICITY: ICD-10-CM

## 2019-04-12 DIAGNOSIS — G56.21 ULNAR NERVE ENTRAPMENT AT ELBOW, RIGHT: ICD-10-CM

## 2019-04-12 PROCEDURE — 97140 MANUAL THERAPY 1/> REGIONS: CPT | Performed by: PHYSICAL THERAPIST

## 2019-04-12 PROCEDURE — 97110 THERAPEUTIC EXERCISES: CPT | Performed by: PHYSICAL THERAPIST

## 2019-04-15 ENCOUNTER — OFFICE VISIT (OUTPATIENT)
Dept: PHYSICAL THERAPY | Facility: MEDICAL CENTER | Age: 19
End: 2019-04-15
Payer: COMMERCIAL

## 2019-04-15 DIAGNOSIS — S43.51XD SPRAIN OF RIGHT ACROMIOCLAVICULAR JOINT, SUBSEQUENT ENCOUNTER: ICD-10-CM

## 2019-04-15 DIAGNOSIS — G56.21 ULNAR NERVE ENTRAPMENT AT ELBOW, RIGHT: ICD-10-CM

## 2019-04-15 DIAGNOSIS — M25.511 RIGHT SHOULDER PAIN, UNSPECIFIED CHRONICITY: ICD-10-CM

## 2019-04-15 DIAGNOSIS — M25.521 RIGHT ELBOW PAIN: Primary | ICD-10-CM

## 2019-04-15 PROCEDURE — 97110 THERAPEUTIC EXERCISES: CPT | Performed by: PHYSICAL THERAPIST

## 2019-04-15 PROCEDURE — 97140 MANUAL THERAPY 1/> REGIONS: CPT | Performed by: PHYSICAL THERAPIST

## 2019-04-22 ENCOUNTER — APPOINTMENT (OUTPATIENT)
Dept: PHYSICAL THERAPY | Facility: MEDICAL CENTER | Age: 19
End: 2019-04-22
Payer: COMMERCIAL

## 2019-04-26 ENCOUNTER — OFFICE VISIT (OUTPATIENT)
Dept: PHYSICAL THERAPY | Facility: MEDICAL CENTER | Age: 19
End: 2019-04-26
Payer: COMMERCIAL

## 2019-04-26 DIAGNOSIS — S43.51XD SPRAIN OF RIGHT ACROMIOCLAVICULAR JOINT, SUBSEQUENT ENCOUNTER: ICD-10-CM

## 2019-04-26 DIAGNOSIS — G56.21 ULNAR NERVE ENTRAPMENT AT ELBOW, RIGHT: ICD-10-CM

## 2019-04-26 DIAGNOSIS — M25.511 RIGHT SHOULDER PAIN, UNSPECIFIED CHRONICITY: ICD-10-CM

## 2019-04-26 DIAGNOSIS — M25.521 RIGHT ELBOW PAIN: Primary | ICD-10-CM

## 2019-04-26 PROCEDURE — 97110 THERAPEUTIC EXERCISES: CPT | Performed by: PHYSICAL THERAPIST

## 2019-04-26 PROCEDURE — 97140 MANUAL THERAPY 1/> REGIONS: CPT | Performed by: PHYSICAL THERAPIST

## 2019-04-29 ENCOUNTER — OFFICE VISIT (OUTPATIENT)
Dept: PHYSICAL THERAPY | Facility: MEDICAL CENTER | Age: 19
End: 2019-04-29
Payer: COMMERCIAL

## 2019-04-29 DIAGNOSIS — G56.21 ULNAR NERVE ENTRAPMENT AT ELBOW, RIGHT: ICD-10-CM

## 2019-04-29 DIAGNOSIS — M25.521 RIGHT ELBOW PAIN: Primary | ICD-10-CM

## 2019-04-29 DIAGNOSIS — S43.51XD SPRAIN OF RIGHT ACROMIOCLAVICULAR JOINT, SUBSEQUENT ENCOUNTER: ICD-10-CM

## 2019-04-29 DIAGNOSIS — M25.511 RIGHT SHOULDER PAIN, UNSPECIFIED CHRONICITY: ICD-10-CM

## 2019-04-29 PROCEDURE — 97110 THERAPEUTIC EXERCISES: CPT | Performed by: PHYSICAL THERAPIST

## 2019-04-29 PROCEDURE — 97140 MANUAL THERAPY 1/> REGIONS: CPT | Performed by: PHYSICAL THERAPIST

## 2022-06-17 ENCOUNTER — OFFICE VISIT (OUTPATIENT)
Dept: URGENT CARE | Facility: CLINIC | Age: 22
End: 2022-06-17

## 2022-06-17 VITALS
TEMPERATURE: 100.8 F | RESPIRATION RATE: 20 BRPM | BODY MASS INDEX: 35.56 KG/M2 | OXYGEN SATURATION: 97 % | HEIGHT: 71 IN | WEIGHT: 254 LBS | HEART RATE: 112 BPM

## 2022-06-17 DIAGNOSIS — Z20.822 ENCOUNTER FOR SCREENING LABORATORY TESTING FOR COVID-19 VIRUS: ICD-10-CM

## 2022-06-17 DIAGNOSIS — J02.9 SORE THROAT: Primary | ICD-10-CM

## 2022-06-17 LAB — S PYO AG THROAT QL: NEGATIVE

## 2022-06-17 PROCEDURE — 87070 CULTURE OTHR SPECIMN AEROBIC: CPT | Performed by: PHYSICIAN ASSISTANT

## 2022-06-17 PROCEDURE — 87880 STREP A ASSAY W/OPTIC: CPT | Performed by: PHYSICIAN ASSISTANT

## 2022-06-17 PROCEDURE — 87636 SARSCOV2 & INF A&B AMP PRB: CPT | Performed by: PHYSICIAN ASSISTANT

## 2022-06-17 PROCEDURE — 99213 OFFICE O/P EST LOW 20 MIN: CPT | Performed by: PHYSICIAN ASSISTANT

## 2022-06-17 RX ORDER — LEVONORGESTREL AND ETHINYL ESTRADIOL 0.1-0.02MG
KIT ORAL
COMMUNITY
Start: 2022-06-13

## 2022-06-17 NOTE — PATIENT INSTRUCTIONS
Swabbed for flu & COVID-19, discussed self quarantine until contacted with results  Monitor for worsening symptoms  C/w OTC symptom relief including tylenol, salt water gargles, lozenges, fluids, rest  Recommend supplementing with vitamins D & C as well as a multivitamin   Discussed etiology is most likely viral

## 2022-06-17 NOTE — PROGRESS NOTES
[facility]        NAME: Yunier Loja is a 19 y o  female  : 2000    MRN: [id number]  DATE: 2022  TIME: 5:15 PM    Assessment and Plan   Sore throat [J02 9]  1  Sore throat  POCT rapid strepA    Throat culture   2  Encounter for screening laboratory testing for COVID-19 virus  Cov/Flu-Collected at [facility]         Patient Instructions     Rapid strep neg, will cx  Follow up with PCP in 3-5 days  Proceed to  ER if symptoms worsen  Chief Complaint     Chief Complaint   Patient presents with    Sore Throat     Started yesterday with sore throat and nasal congestion, had fever this am, pt did not take temp, taking benadryl, and motrin, no sick contacts, no hx of seasonal allergies, hx of strep throat, no home covid test, wants swab, pt is NOT vaccinated for covid or flu          History of Present Illness       Patient presents with mother for complaint of sore throat which began yesterday  She has scratched associated nasal congestion, postnasal drip, and fever  She reports taking various over-the-counter measures without significant improvement  She denies any recent sick contacts  She denies history of allergies  She denies being vaccinated for COVID or flu  Patient denies chills, sweats, chest tightness, dyspnea, ear pain headache, abdominal pain, nausea, vomiting, and diarrhea  She does note sinus pressure  Review of Systems   Review of Systems   Constitutional: Positive for fever  Negative for chills and diaphoresis  HENT: Positive for congestion, postnasal drip and sore throat  Negative for ear pain  Eyes: Negative for pain and visual disturbance  Respiratory: Negative for cough, chest tightness and shortness of breath  Cardiovascular: Negative for chest pain and palpitations  Gastrointestinal: Negative for abdominal pain, nausea and vomiting  Genitourinary: Negative for dysuria and hematuria     Musculoskeletal: Negative for arthralgias, back pain and myalgias  Skin: Negative for color change and rash  Neurological: Negative for seizures, syncope and headaches  All other systems reviewed and are negative  Current Medications       Current Outpatient Medications:     Falmina 0 1-20 MG-MCG per tablet, , Disp: , Rfl:     Current Allergies     Allergies as of 06/17/2022    (No Known Allergies)            The following portions of the patient's history were reviewed and updated as appropriate: allergies, current medications, past family history, past medical history, past social history, past surgical history and problem list      History reviewed  No pertinent past medical history  Past Surgical History:   Procedure Laterality Date    FL INJECTION RIGHT SHOULDER (ARTHROGRAM)  1/16/2019       No family history on file  Medications have been verified  Objective   Pulse (!) 112   Temp (!) 100 8 °F (38 2 °C) (Tympanic)   Resp 20   Ht 5' 11" (1 803 m)   Wt 115 kg (254 lb)   SpO2 97%   BMI 35 43 kg/m²   No LMP recorded  Physical Exam     Physical Exam  Vitals and nursing note reviewed  Constitutional:       General: She is not in acute distress  Appearance: Normal appearance  She is well-developed  She is not ill-appearing or diaphoretic  HENT:      Head: Normocephalic and atraumatic  Right Ear: Tympanic membrane and ear canal normal  Tympanic membrane is not erythematous  Left Ear: Tympanic membrane and ear canal normal  Tympanic membrane is not erythematous  Nose: Congestion present  Mouth/Throat:      Mouth: Mucous membranes are moist       Pharynx: Posterior oropharyngeal erythema present  No oropharyngeal exudate  Tonsils: No tonsillar exudate  0 on the right  0 on the left  Comments: +pnd  Eyes:      Conjunctiva/sclera: Conjunctivae normal       Pupils: Pupils are equal, round, and reactive to light  Cardiovascular:      Rate and Rhythm: Normal rate and regular rhythm  Heart sounds: Normal heart sounds  Pulmonary:      Effort: Pulmonary effort is normal  No respiratory distress  Breath sounds: Normal breath sounds  No stridor  No wheezing, rhonchi or rales  Musculoskeletal:      Cervical back: Normal range of motion and neck supple  Lymphadenopathy:      Cervical: No cervical adenopathy  Skin:     General: Skin is warm and dry  Capillary Refill: Capillary refill takes less than 2 seconds  Findings: No rash  Neurological:      Mental Status: She is alert and oriented to person, place, and time  Cranial Nerves: No cranial nerve deficit  Sensory: No sensory deficit  Psychiatric:         Behavior: Behavior normal          Thought Content:  Thought content normal

## 2022-06-17 NOTE — LETTER
[facility] [id number]  Dept: [phone]    June 17, 2022    Patient: Norma Mosquera  YOB: 2000    Norma Mosquera was seen and evaluated at our [facility]  Please note if Covid and Flu tests are negative, they may return to work when fever free for 24 hours without the use of a fever reducing agent  If Covid or Flu test is positive, they may return to work on 6/21/2022, as this is 5 days from the onset of symptoms  Upon return, they must then adhere to strict masking for an additional 5 days      Sincerely,        Latosha Farias PA-C

## 2022-06-18 LAB
FLUAV RNA RESP QL NAA+PROBE: NEGATIVE
FLUBV RNA RESP QL NAA+PROBE: NEGATIVE
SARS-COV-2 RNA RESP QL NAA+PROBE: POSITIVE

## 2022-06-20 ENCOUNTER — TELEPHONE (OUTPATIENT)
Dept: URGENT CARE | Facility: CLINIC | Age: 22
End: 2022-06-20

## 2022-06-20 LAB — BACTERIA THROAT CULT: NORMAL

## 2023-06-14 ENCOUNTER — OFFICE VISIT (OUTPATIENT)
Dept: URGENT CARE | Facility: CLINIC | Age: 23
End: 2023-06-14
Payer: COMMERCIAL

## 2023-06-14 VITALS
OXYGEN SATURATION: 97 % | SYSTOLIC BLOOD PRESSURE: 142 MMHG | BODY MASS INDEX: 32.06 KG/M2 | HEART RATE: 74 BPM | HEIGHT: 71 IN | RESPIRATION RATE: 18 BRPM | DIASTOLIC BLOOD PRESSURE: 82 MMHG | TEMPERATURE: 98.8 F | WEIGHT: 229 LBS

## 2023-06-14 DIAGNOSIS — J35.8 TONSILLITH: Primary | ICD-10-CM

## 2023-06-14 DIAGNOSIS — J02.9 SORE THROAT: ICD-10-CM

## 2023-06-14 LAB — S PYO AG THROAT QL: NEGATIVE

## 2023-06-14 PROCEDURE — 87070 CULTURE OTHR SPECIMN AEROBIC: CPT

## 2023-06-14 PROCEDURE — 87880 STREP A ASSAY W/OPTIC: CPT

## 2023-06-14 PROCEDURE — 99213 OFFICE O/P EST LOW 20 MIN: CPT

## 2023-06-14 RX ORDER — DIPHENHYDRAMINE HYDROCHLORIDE AND LIDOCAINE HYDROCHLORIDE AND ALUMINUM HYDROXIDE AND MAGNESIUM HYDRO
10 KIT EVERY 4 HOURS PRN
Qty: 119 ML | Refills: 0 | Status: SHIPPED | OUTPATIENT
Start: 2023-06-14

## 2023-06-14 NOTE — PATIENT INSTRUCTIONS
Start magic mouthwash as prescribed  Tylenol/ibuprofen as needed  Multivitamin daily  Fluids and rest  Over the counter cold medication as needed  Salt water gargles  Follow up with PCP in 3-5 days    Proceed to ER if symptoms worsen

## 2023-06-14 NOTE — PROGRESS NOTES
[facility]        NAME: Tomasz Costello is a 23 y o  female  : 2000    MRN: [id number]  DATE: 2023  TIME: 9:51 AM    Assessment and Plan   Tonsillith [J35 8]  1  Tonsillith        2  Sore throat  POCT rapid strepA    Throat culture    diphenhydramine, lidocaine, Al/Mg hydroxide, simethicone (Magic Mouthwash) SUSP        POCT rapid strep: negative    Patient Instructions     Start magic mouthwash as prescribed  Tylenol/ibuprofen as needed  Multivitamin daily  Fluids and rest  Over the counter cold medication as needed  Salt water gargles  Follow up with PCP in 3-5 days  Proceed to ER if symptoms worsen    Chief Complaint     Chief Complaint   Patient presents with   • Sore Throat     PT presents with sore throat and right ear pain x 4 days  History of Present Illness       Patient is a 22 yo female with significant PMH of seasonal allergies presenting in the clinic today for sore throat x 4 days  Admits right ear pain, sore throat, and congestion  Denies fever, chills, body aches, fatigue, ear discharge, decreased hearing, headache, sinus pain/pressure, chest pain, SOB, abdominal pain, n/v/d  Admits the use of ibuprofen for symptom management  Denies recent sick contacts  Review of Systems   Review of Systems   Constitutional: Negative for chills, fatigue and fever  HENT: Positive for congestion, ear pain and sore throat  Negative for postnasal drip, rhinorrhea, sinus pressure and sinus pain  Respiratory: Negative for cough and shortness of breath  Cardiovascular: Negative for chest pain  Gastrointestinal: Negative for abdominal pain, diarrhea, nausea and vomiting  Musculoskeletal: Negative for myalgias  Skin: Negative for rash  Neurological: Negative for headaches           Current Medications       Current Outpatient Medications:   •  diphenhydramine, lidocaine, Al/Mg hydroxide, simethicone (Magic Mouthwash) SUSP, Swish and spit 10 mL every 4 (four) hours as "needed for mouth pain or discomfort, Disp: 119 mL, Rfl: 0  •  Falmina 0 1-20 MG-MCG per tablet, , Disp: , Rfl:     Current Allergies     Allergies as of 06/14/2023   • (No Known Allergies)            The following portions of the patient's history were reviewed and updated as appropriate: allergies, current medications, past family history, past medical history, past social history, past surgical history and problem list      History reviewed  No pertinent past medical history  Past Surgical History:   Procedure Laterality Date   • FL INJECTION RIGHT SHOULDER (ARTHROGRAM)  1/16/2019       No family history on file  Medications have been verified  Objective   /82   Pulse 74   Temp 98 8 °F (37 1 °C)   Resp 18   Ht 5' 11\" (1 803 m)   Wt 104 kg (229 lb)   SpO2 97%   BMI 31 94 kg/m²        Physical Exam     Physical Exam  Vitals reviewed  Constitutional:       General: She is not in acute distress  Appearance: Normal appearance  She is normal weight  She is not ill-appearing  HENT:      Head: Normocephalic  Right Ear: Hearing, tympanic membrane, ear canal and external ear normal  No middle ear effusion  There is no impacted cerumen  Tympanic membrane is not erythematous or bulging  Left Ear: Hearing, tympanic membrane, ear canal and external ear normal   No middle ear effusion  There is no impacted cerumen  Tympanic membrane is not erythematous or bulging  Nose: Nose normal  No congestion or rhinorrhea  Right Sinus: No maxillary sinus tenderness or frontal sinus tenderness  Left Sinus: No maxillary sinus tenderness or frontal sinus tenderness  Mouth/Throat:      Lips: Pink  Mouth: Mucous membranes are moist       Pharynx: Oropharynx is clear  Uvula midline  Posterior oropharyngeal erythema present  No pharyngeal swelling, oropharyngeal exudate or uvula swelling  Tonsils: No tonsillar exudate or tonsillar abscesses  2+ on the right   1+ on the " left       Comments: Multiple tonsilliths noted along the right tonsil  Eyes:      Conjunctiva/sclera: Conjunctivae normal    Cardiovascular:      Rate and Rhythm: Normal rate and regular rhythm  Pulses: Normal pulses  Heart sounds: Normal heart sounds  No murmur heard  No friction rub  No gallop  Pulmonary:      Effort: Pulmonary effort is normal       Breath sounds: Normal breath sounds  No wheezing, rhonchi or rales  Musculoskeletal:      Cervical back: Normal range of motion and neck supple  No tenderness  Lymphadenopathy:      Cervical: No cervical adenopathy  Skin:     General: Skin is warm  Findings: No rash  Neurological:      Mental Status: She is alert     Psychiatric:         Mood and Affect: Mood normal          Behavior: Behavior normal

## 2023-06-16 LAB — BACTERIA THROAT CULT: NORMAL

## 2024-02-28 ENCOUNTER — OFFICE VISIT (OUTPATIENT)
Dept: URGENT CARE | Facility: CLINIC | Age: 24
End: 2024-02-28
Payer: COMMERCIAL

## 2024-02-28 VITALS
HEIGHT: 72 IN | WEIGHT: 223.6 LBS | BODY MASS INDEX: 30.28 KG/M2 | DIASTOLIC BLOOD PRESSURE: 96 MMHG | TEMPERATURE: 97.8 F | SYSTOLIC BLOOD PRESSURE: 140 MMHG | OXYGEN SATURATION: 99 % | RESPIRATION RATE: 16 BRPM | HEART RATE: 79 BPM

## 2024-02-28 DIAGNOSIS — J01.40 ACUTE NON-RECURRENT PANSINUSITIS: Primary | ICD-10-CM

## 2024-02-28 LAB
SARS-COV-2 AG UPPER RESP QL IA: NEGATIVE
VALID CONTROL: NORMAL

## 2024-02-28 PROCEDURE — 99213 OFFICE O/P EST LOW 20 MIN: CPT | Performed by: PHYSICIAN ASSISTANT

## 2024-02-28 PROCEDURE — 87811 SARS-COV-2 COVID19 W/OPTIC: CPT | Performed by: PHYSICIAN ASSISTANT

## 2024-02-28 RX ORDER — AMOXICILLIN AND CLAVULANATE POTASSIUM 875; 125 MG/1; MG/1
1 TABLET, FILM COATED ORAL EVERY 12 HOURS SCHEDULED
Qty: 14 TABLET | Refills: 0 | Status: SHIPPED | OUTPATIENT
Start: 2024-02-28 | End: 2024-03-06

## 2024-02-28 RX ORDER — GABAPENTIN 100 MG/1
100 CAPSULE ORAL 3 TIMES DAILY
COMMUNITY
Start: 2024-02-07 | End: 2025-02-06

## 2024-02-28 RX ORDER — ALBUTEROL SULFATE 90 UG/1
2 AEROSOL, METERED RESPIRATORY (INHALATION) EVERY 6 HOURS PRN
Qty: 8 G | Refills: 0 | Status: SHIPPED | OUTPATIENT
Start: 2024-02-28

## 2024-02-28 RX ORDER — DIAZEPAM 5 MG/1
TABLET ORAL
COMMUNITY
Start: 2024-02-09

## 2024-02-28 NOTE — PATIENT INSTRUCTIONS
Take Augmentin as directed until completed  Use albuterol as needed for chest congestion chest tightness  Continue with home medications as prescribed  Follow up with PCP in 3-5 days.  Proceed to  ER if symptoms worsen    Sinusitis   AMBULATORY CARE:   Sinusitis  is inflammation or infection of your sinuses. Sinusitis is most often caused by a virus. Acute sinusitis may last up to 12 weeks. Chronic sinusitis lasts longer than 12 weeks. Recurrent sinusitis means you have 4 or more infections in 1 year.        Common signs and symptoms:   Fever    Pain, pressure, redness, or swelling around the forehead, cheeks, or eyes    Thick yellow or green discharge from your nose    Tenderness when you touch your face over your sinuses    Dry cough that happens mostly at night or when you lie down    Headache and face pain that is worse when you lean forward    Tooth pain, or pain when you chew    Seek care immediately if:   You have trouble breathing or wheezing that is getting worse.    You have a stiff neck, a fever, or a bad headache.     You cannot open your eye.     Your eyeball bulges out or you cannot move your eye.     You are more sleepy than normal, or you notice changes in your ability to think, move, or talk.    You have swelling of your forehead or scalp.    Call your doctor if:   You have vision changes, such as double vision.    Your eye and eyelid are red, swollen, and painful.     Your symptoms do not improve or go away after 10 days.    You have nausea and are vomiting.    Your nose is bleeding.    You have questions or concerns about your condition or care.    Medicines:  Your symptoms may go away on their own. Your healthcare provider may recommend watchful waiting for up to 10 days before starting antibiotics. You may need any of the following:  Acetaminophen  decreases pain and fever. It is available without a doctor's order. Ask how much to take and how often to take it. Follow directions. Read the labels  of all other medicines you are using to see if they also contain acetaminophen, or ask your doctor or pharmacist. Acetaminophen can cause liver damage if not taken correctly.    NSAIDs , such as ibuprofen, help decrease swelling, pain, and fever. This medicine is available with or without a doctor's order. NSAIDs can cause stomach bleeding or kidney problems in certain people. If you take blood thinner medicine, always ask your healthcare provider if NSAIDs are safe for you. Always read the medicine label and follow directions.    Nasal steroid sprays  may help decrease inflammation in your nose and sinuses.    Decongestants  help reduce swelling and drain mucus in the nose and sinuses. They may help you breathe easier.     Antihistamines  help dry mucus in the nose and relieve sneezing.     Antibiotics  help treat or prevent a bacterial infection.    Self-care:   Rinse your sinuses as directed.  Use a sinus rinse device to rinse your nasal passages with a saline (salt water) solution or distilled water. Do not use tap water. This will help thin the mucus in your nose and rinse away pollen and dirt. It will also help reduce swelling so you can breathe normally.    Use a humidifier  to increase air moisture in your home. This may make it easier for you to breathe and help decrease your cough.     Sleep with your head elevated.  Place an extra pillow under your head before you go to sleep to help your sinuses drain.     Drink liquids as directed.  Ask your healthcare provider how much liquid to drink each day and which liquids are best for you. Liquids will thin the mucus in your nose and help it drain. Avoid drinks that contain alcohol or caffeine.     Do not smoke, and avoid secondhand smoke.  Nicotine and other chemicals in cigarettes and cigars can make your symptoms worse. Ask your healthcare provider for information if you currently smoke and need help to quit. E-cigarettes or smokeless tobacco still contain  nicotine. Talk to your healthcare provider before you use these products.    Prevent the spread of germs:   Wash your hands often with soap and water.  Wash your hands after you use the bathroom, change a child's diaper, or sneeze. Wash your hands before you prepare or eat food.         Stay away from people who are sick.  Some germs spread easily and quickly through contact.    Follow up with your doctor as directed:  You may be referred to an ear, nose, and throat specialist. Write down your questions so you remember to ask them during your visits.   © Copyright [employer] 2023 Information is for End User's use only and may not be sold, redistributed or otherwise used for commercial purposes.  The above information is an  only. It is not intended as medical advice for individual conditions or treatments. Talk to your doctor, nurse or pharmacist before following any medical regimen to see if it is safe and effective for you.

## 2024-02-28 NOTE — PROGRESS NOTES
[facility]        NAME: Sandrine Avila is a 23 y.o. female  : 2000    MRN: [id number]  DATE: 2024  TIME: 3:25 PM    Assessment and Plan   Acute non-recurrent pansinusitis [J01.40]  1. Acute non-recurrent pansinusitis  Poct Covid 19 Rapid Antigen Test    amoxicillin-clavulanate (AUGMENTIN) 875-125 mg per tablet    albuterol (PROVENTIL HFA,VENTOLIN HFA) 90 mcg/act inhaler            Patient Instructions     Take Augmentin as directed until completed  Use albuterol as needed for chest congestion chest tightness  Continue with home medications as prescribed  Follow up with PCP in 3-5 days.  Proceed to  ER if symptoms worsen.    Chief Complaint     Chief Complaint   Patient presents with    Cough     Patient reports chest congestion & cough for 1 week. Taking dayquil.         History of Present Illness       23-year-old female presents with sinus pain pressure congestion chest congestion cough.  Symptoms for over a week now.  Has been taking over-the-counter remedies without any relief.  Denies any chest pain.  No abdominal pain nausea vomiting diarrhea.  Reports that having fevers last week.    Sinusitis  This is a new problem. The current episode started 1 to 4 weeks ago. The problem has been waxing and waning since onset. There has been no fever. The pain is moderate. Associated symptoms include congestion, coughing, headaches, sinus pressure and a sore throat. Past treatments include oral decongestants, sitting up, lying down and nasal decongestants. The treatment provided no relief.       Review of Systems   Review of Systems   Constitutional: Negative.    HENT:  Positive for congestion, sinus pressure and sore throat.    Eyes: Negative.    Respiratory:  Positive for cough.    Cardiovascular: Negative.    Gastrointestinal: Negative.    Musculoskeletal: Negative.    Skin: Negative.    Neurological:  Positive for headaches.         Current Medications       Current Outpatient  "Medications:     albuterol (PROVENTIL HFA,VENTOLIN HFA) 90 mcg/act inhaler, Inhale 2 puffs every 6 (six) hours as needed for wheezing or shortness of breath, Disp: 8 g, Rfl: 0    amoxicillin-clavulanate (AUGMENTIN) 875-125 mg per tablet, Take 1 tablet by mouth every 12 (twelve) hours for 7 days, Disp: 14 tablet, Rfl: 0    Falmina 0.1-20 MG-MCG per tablet, , Disp: , Rfl:     gabapentin (NEURONTIN) 100 mg capsule, Take 100 mg by mouth Three times a day, Disp: , Rfl:     diazepam (VALIUM) 5 mg tablet, Take 1 tablet by mouth 30 min prior to MRI and 1 tablet by mouth on arrival as needed for anxiety/clautrophobia (Patient not taking: Reported on 2/28/2024), Disp: , Rfl:     diphenhydramine, lidocaine, Al/Mg hydroxide, simethicone (Magic Mouthwash) SUSP, Swish and spit 10 mL every 4 (four) hours as needed for mouth pain or discomfort (Patient not taking: Reported on 2/28/2024), Disp: 119 mL, Rfl: 0    Current Allergies     Allergies as of 02/28/2024    (No Known Allergies)            The following portions of the patient's history were reviewed and updated as appropriate: allergies, current medications, past family history, past medical history, past social history, past surgical history and problem list.     History reviewed. No pertinent past medical history.    Past Surgical History:   Procedure Laterality Date    FL INJECTION RIGHT SHOULDER (ARTHROGRAM)  1/16/2019       History reviewed. No pertinent family history.      Medications have been verified.        Objective   /96   Pulse 79   Temp 97.8 °F (36.6 °C) (Tympanic)   Resp 16   Ht 5' 11.5\" (1.816 m)   Wt 101 kg (223 lb 9.6 oz)   SpO2 99%   BMI 30.75 kg/m²   No LMP recorded.       Physical Exam     Physical Exam  Vitals and nursing note reviewed.   Constitutional:       General: She is not in acute distress.     Appearance: Normal appearance. She is well-developed.   HENT:      Head: Normocephalic and atraumatic.      Right Ear: Hearing, tympanic " membrane, ear canal and external ear normal. There is no impacted cerumen.      Left Ear: Hearing, tympanic membrane, ear canal and external ear normal. There is no impacted cerumen.      Nose: Congestion and rhinorrhea present.      Right Sinus: Maxillary sinus tenderness and frontal sinus tenderness present.      Left Sinus: Maxillary sinus tenderness and frontal sinus tenderness present.      Mouth/Throat:      Pharynx: Uvula midline. Posterior oropharyngeal erythema (Mild) present. No oropharyngeal exudate.   Eyes:      General:         Right eye: No discharge.         Left eye: No discharge.      Conjunctiva/sclera: Conjunctivae normal.   Cardiovascular:      Rate and Rhythm: Normal rate and regular rhythm.      Heart sounds: Normal heart sounds. No murmur heard.  Pulmonary:      Effort: Pulmonary effort is normal. No respiratory distress.      Breath sounds: Normal breath sounds. No wheezing or rales.   Abdominal:      General: Bowel sounds are normal.      Palpations: Abdomen is soft.      Tenderness: There is no abdominal tenderness.   Musculoskeletal:         General: Normal range of motion.      Cervical back: Normal range of motion and neck supple.   Lymphadenopathy:      Cervical: No cervical adenopathy.   Skin:     General: Skin is warm and dry.   Neurological:      Mental Status: She is alert and oriented to person, place, and time.   Psychiatric:         Mood and Affect: Mood normal.